# Patient Record
Sex: FEMALE | Race: WHITE | Employment: PART TIME | URBAN - METROPOLITAN AREA
[De-identification: names, ages, dates, MRNs, and addresses within clinical notes are randomized per-mention and may not be internally consistent; named-entity substitution may affect disease eponyms.]

---

## 2023-03-16 ENCOUNTER — HOSPITAL ENCOUNTER (EMERGENCY)
Age: 38
Discharge: HOME | End: 2023-03-16
Attending: EMERGENCY MEDICINE
Payer: MEDICAID

## 2023-03-16 ENCOUNTER — APPOINTMENT (OUTPATIENT)
Dept: GENERAL RADIOLOGY | Age: 38
End: 2023-03-16
Payer: MEDICAID

## 2023-03-16 VITALS
WEIGHT: 293 LBS | RESPIRATION RATE: 18 BRPM | DIASTOLIC BLOOD PRESSURE: 98 MMHG | TEMPERATURE: 97 F | HEART RATE: 91 BPM | SYSTOLIC BLOOD PRESSURE: 175 MMHG | OXYGEN SATURATION: 96 %

## 2023-03-16 DIAGNOSIS — U07.1 COVID: Primary | ICD-10-CM

## 2023-03-16 DIAGNOSIS — E86.0 DEHYDRATION: ICD-10-CM

## 2023-03-16 LAB
ALBUMIN SERPL-MCNC: 4.3 G/DL (ref 3.5–5.2)
ALBUMIN/GLOBULIN RATIO: 1.1 (ref 1–2.5)
ALP SERPL-CCNC: 85 U/L (ref 35–104)
ALT SERPL-CCNC: 91 U/L (ref 5–33)
ANION GAP SERPL CALCULATED.3IONS-SCNC: 19 MMOL/L (ref 9–17)
AST SERPL-CCNC: 147 U/L
BILIRUB SERPL-MCNC: 0.4 MG/DL (ref 0.3–1.2)
BUN SERPL-MCNC: 12 MG/DL (ref 6–20)
CALCIUM SERPL-MCNC: 8.7 MG/DL (ref 8.6–10.4)
CHLORIDE SERPL-SCNC: 97 MMOL/L (ref 98–107)
CO2 SERPL-SCNC: 20 MMOL/L (ref 20–31)
CREAT SERPL-MCNC: 1.11 MG/DL (ref 0.5–0.9)
CRP SERPL HS-MCNC: <3 MG/L (ref 0–5)
GFR SERPL CREATININE-BSD FRML MDRD: >60 ML/MIN/1.73M2
GLUCOSE SERPL-MCNC: 102 MG/DL (ref 70–99)
HCT VFR BLD AUTO: 43.4 % (ref 36.3–47.1)
HGB BLD-MCNC: 14.3 G/DL (ref 11.9–15.1)
LACTIC ACID, WHOLE BLOOD: 0.8 MMOL/L (ref 0.7–2.1)
LACTIC ACID, WHOLE BLOOD: 3.1 MMOL/L (ref 0.7–2.1)
LIPASE SERPL-CCNC: 42 U/L (ref 13–60)
MAGNESIUM SERPL-MCNC: 2 MG/DL (ref 1.6–2.6)
MCH RBC QN AUTO: 31.6 PG (ref 25.2–33.5)
MCHC RBC AUTO-ENTMCNC: 32.9 G/DL (ref 28.4–34.8)
MCV RBC AUTO: 95.8 FL (ref 82.6–102.9)
NRBC AUTOMATED: 0 PER 100 WBC
PDW BLD-RTO: 14.4 % (ref 11.8–14.4)
PLATELET # BLD AUTO: 178 K/UL (ref 138–453)
PMV BLD AUTO: 9.9 FL (ref 8.1–13.5)
POTASSIUM SERPL-SCNC: 4.4 MMOL/L (ref 3.7–5.3)
PROT SERPL-MCNC: 8.3 G/DL (ref 6.4–8.3)
RBC # BLD: 4.53 M/UL (ref 3.95–5.11)
SODIUM SERPL-SCNC: 136 MMOL/L (ref 135–144)
TROPONIN I SERPL DL<=0.01 NG/ML-MCNC: 11 NG/L (ref 0–14)
TROPONIN I SERPL DL<=0.01 NG/ML-MCNC: <6 NG/L (ref 0–14)
WBC # BLD AUTO: 4.2 K/UL (ref 3.5–11.3)

## 2023-03-16 PROCEDURE — 6360000002 HC RX W HCPCS: Performed by: STUDENT IN AN ORGANIZED HEALTH CARE EDUCATION/TRAINING PROGRAM

## 2023-03-16 PROCEDURE — 96361 HYDRATE IV INFUSION ADD-ON: CPT

## 2023-03-16 PROCEDURE — 83690 ASSAY OF LIPASE: CPT

## 2023-03-16 PROCEDURE — 96374 THER/PROPH/DIAG INJ IV PUSH: CPT

## 2023-03-16 PROCEDURE — 2580000003 HC RX 258: Performed by: STUDENT IN AN ORGANIZED HEALTH CARE EDUCATION/TRAINING PROGRAM

## 2023-03-16 PROCEDURE — 71046 X-RAY EXAM CHEST 2 VIEWS: CPT

## 2023-03-16 PROCEDURE — 85027 COMPLETE CBC AUTOMATED: CPT

## 2023-03-16 PROCEDURE — 99285 EMERGENCY DEPT VISIT HI MDM: CPT

## 2023-03-16 PROCEDURE — 86140 C-REACTIVE PROTEIN: CPT

## 2023-03-16 PROCEDURE — 84484 ASSAY OF TROPONIN QUANT: CPT

## 2023-03-16 PROCEDURE — 96376 TX/PRO/DX INJ SAME DRUG ADON: CPT

## 2023-03-16 PROCEDURE — 80053 COMPREHEN METABOLIC PANEL: CPT

## 2023-03-16 PROCEDURE — 83735 ASSAY OF MAGNESIUM: CPT

## 2023-03-16 PROCEDURE — 83605 ASSAY OF LACTIC ACID: CPT

## 2023-03-16 RX ORDER — ONDANSETRON 2 MG/ML
4 INJECTION INTRAMUSCULAR; INTRAVENOUS ONCE
Status: COMPLETED | OUTPATIENT
Start: 2023-03-16 | End: 2023-03-16

## 2023-03-16 RX ORDER — ONDANSETRON 4 MG/1
4 TABLET, ORALLY DISINTEGRATING ORAL 3 TIMES DAILY PRN
Qty: 21 TABLET | Refills: 0 | Status: SHIPPED | OUTPATIENT
Start: 2023-03-16

## 2023-03-16 RX ORDER — 0.9 % SODIUM CHLORIDE 0.9 %
1000 INTRAVENOUS SOLUTION INTRAVENOUS ONCE
Status: DISCONTINUED | OUTPATIENT
Start: 2023-03-16 | End: 2023-03-16 | Stop reason: HOSPADM

## 2023-03-16 RX ORDER — ACETAMINOPHEN 500 MG
1000 TABLET ORAL ONCE
Status: DISCONTINUED | OUTPATIENT
Start: 2023-03-16 | End: 2023-03-16 | Stop reason: HOSPADM

## 2023-03-16 RX ORDER — METOCLOPRAMIDE 10 MG/1
10 TABLET ORAL 4 TIMES DAILY
Qty: 20 TABLET | Refills: 0 | Status: SHIPPED | OUTPATIENT
Start: 2023-03-16

## 2023-03-16 RX ORDER — 0.9 % SODIUM CHLORIDE 0.9 %
1000 INTRAVENOUS SOLUTION INTRAVENOUS ONCE
Status: COMPLETED | OUTPATIENT
Start: 2023-03-16 | End: 2023-03-16

## 2023-03-16 RX ADMIN — ONDANSETRON 4 MG: 2 INJECTION INTRAMUSCULAR; INTRAVENOUS at 20:05

## 2023-03-16 RX ADMIN — ONDANSETRON 4 MG: 2 INJECTION INTRAMUSCULAR; INTRAVENOUS at 19:19

## 2023-03-16 RX ADMIN — SODIUM CHLORIDE 1000 ML: 9 INJECTION, SOLUTION INTRAVENOUS at 19:19

## 2023-03-16 ASSESSMENT — ENCOUNTER SYMPTOMS
SHORTNESS OF BREATH: 0
BACK PAIN: 1
DIARRHEA: 1
VOMITING: 1
COUGH: 1
ABDOMINAL PAIN: 1
NAUSEA: 1

## 2023-03-16 ASSESSMENT — PAIN - FUNCTIONAL ASSESSMENT: PAIN_FUNCTIONAL_ASSESSMENT: 0-10

## 2023-03-16 ASSESSMENT — PAIN SCALES - GENERAL: PAINLEVEL_OUTOF10: 7

## 2023-03-16 NOTE — Clinical Note
Chno Mcintyre was seen and treated in our emergency department on 3/16/2023. She may return to work on . If you have any questions or concerns, please don't hesitate to call.       Andres Delcid, DO

## 2023-03-16 NOTE — Clinical Note
Kole Shanks was seen and treated in our emergency department on 3/16/2023. She may return to work on . Asymptomatic without fevers for 24 - 48 hrs     If you have any questions or concerns, please don't hesitate to call.       Clinton Storey, DO

## 2023-03-16 NOTE — ED PROVIDER NOTES
[facility]  ED  Emergency Department Encounter  Emergency Medicine Resident     Pt Aníbal Tolentino  MRN: [id number]  Janaegfkimberley 1985  Date of evaluation: 3/16/23  PCP:  No primary care provider on file. CHIEF COMPLAINT       Chief Complaint   Patient presents with    Positive For Covid-19    Nausea       HISTORY OF PRESENT ILLNESS  (Location/Symptom, Timing/Onset, Context/Setting, Quality, Duration, Modifying Factors, Severity.)      Timoteo Huynh is a 30 y.o. female who presents with complaints of worsening abdominal pain, persistent fevers, persistent chills. Diagnosed with COVID 3 days ago and is on day 5 total of symptoms. She states that she tried to go back to work today and was unable to. She called her friend as he was driving by, he found her laying on the ground on the cement dry heaving. She has been taking DayQuil and NyQuil at home for symptom relief. She does have an extensive surgical history including multiple surgeries by both general surgery, bariatric surgery and OB/GYN. PAST MEDICAL / SURGICAL / SOCIAL / FAMILY HISTORY      has no past medical history on file. has no past surgical history on file.       Social History     Socioeconomic History    Marital status:      Spouse name: Not on file    Number of children: Not on file    Years of education: Not on file    Highest education level: Not on file   Occupational History    Not on file   Tobacco Use    Smoking status: Not on file    Smokeless tobacco: Not on file   Substance and Sexual Activity    Alcohol use: Not on file    Drug use: Not on file    Sexual activity: Not on file   Other Topics Concern    Not on file   Social History Narrative    Not on file     Social Determinants of Health     Financial Resource Strain: Not on file   Food Insecurity: Not on file   Transportation Needs: Not on file   Physical Activity: Not on file   Stress: Not on file   Social Connections: Not on file Intimate Partner Violence: Not on file   Housing Stability: Not on file       History reviewed. No pertinent family history. Allergies:  Ibuprofen    Home Medications:  Prior to Admission medications    Not on File           REVIEW OF SYSTEMS       Review of Systems   Constitutional:  Positive for activity change, appetite change, chills and fever. Respiratory:  Positive for cough. Negative for shortness of breath. Cardiovascular:  Negative for chest pain. Gastrointestinal:  Positive for abdominal pain, diarrhea, nausea and vomiting. Genitourinary:  Negative for difficulty urinating, flank pain and frequency. Musculoskeletal:  Positive for arthralgias, back pain and myalgias. Psychiatric/Behavioral:  The patient is nervous/anxious. PHYSICAL EXAM      INITIAL VITALS:   BP (!) 175/98   Pulse 91   Temp 97 °F (36.1 °C) (Oral)   Resp 18   Wt (!) 310 lb (140.6 kg)   SpO2 96%     Physical Exam  Constitutional:       General: She is in acute distress. Appearance: She is obese. She is not ill-appearing. HENT:      Head: Normocephalic and atraumatic. Nose: Nose normal.   Eyes:      Extraocular Movements: Extraocular movements intact. Pupils: Pupils are equal, round, and reactive to light. Cardiovascular:      Rate and Rhythm: Normal rate and regular rhythm. Pulses: Normal pulses. Pulmonary:      Effort: Pulmonary effort is normal. No respiratory distress. Breath sounds: Normal breath sounds. No wheezing or rales. Abdominal:      General: Abdomen is flat. Palpations: Abdomen is soft. Tenderness: There is abdominal tenderness. There is no guarding or rebound. Musculoskeletal:         General: Normal range of motion. Right lower leg: No edema. Left lower leg: No edema. Skin:     General: Skin is warm and dry. Capillary Refill: Capillary refill takes less than 2 seconds. Neurological:      General: No focal deficit present.       Mental Status: She is oriented to person, place, and time. Psychiatric:      Comments: Anxious         DDX/DIAGNOSTIC RESULTS / EMERGENCY DEPARTMENT COURSE / MDM     Medical Decision Making  Theron Virgen is a 30 y.o. female who presents with complaints of worsening abdominal pain, persistent fevers, persistent chills. Diagnosed with COVID 3 days ago and is on day 5 total of symptoms. She states that she tried to go back to work today and was unable to. She called her friend as he was driving by, he found her laying on the ground on the cement dry heaving. She has been taking DayQuil and NyQuil at home for symptom relief. She does have an extensive surgical history including multiple surgeries by both general surgery, bariatric surgery and OB/GYN. On exam the patient is writhing around, tremulous, appears to have rigors and chills. Belly soft, minimal tenderness to palpation, nonsurgical abdomen, breath sounds clear, vital stable. We will repeat two-view chest x-ray to rule out pneumonia, repeat labs given history of profound diarrhea and decreased p.o. intake, begin IV fluid resuscitation, began IV nausea medication and Tylenol for fever/chills. Lactic acid elevated at 3.1, creatinine mildly elevated, aggressive fluid resuscitation and repeat nausea control. We will repeat a lactic acid and reevaluate the patient. Patient can likely be discharged home. Patient signed out prior to discharge to Dr. Lanre Patel and/or Complexity of Data Reviewed  Labs: ordered. Radiology: ordered. ECG/medicine tests: ordered. Risk  OTC drugs. Prescription drug management. Procedures    CONSULTS:  None        FINAL IMPRESSION      1. COVID          DISPOSITION / PLAN     DISPOSITION    Please see Dr. Steiner Shallow note     Queen Carolinmenez:  No follow-up provider specified.     DISCHARGE MEDICATIONS:  New Prescriptions    No medications on file       Shirin Silverman MD  Emergency Medicine Resident    (Please note that portions of thisnote were completed with a voice recognition program.  Efforts were made to edit the dictations but occasionally words are mis-transcribed.)       Crystal Sandoval MD  Resident  03/16/23 2022

## 2023-03-16 NOTE — ED PROVIDER NOTES
[facility] ED     Emergency Department     Faculty Attestation        I performed a history and physical examination of the patient and discussed management with the resident. I reviewed the residents note and agree with the documented findings and plan of care. Any areas of disagreement are noted on the chart. I was personally present for the key portions of any procedures. I have documented in the chart those procedures where I was not present during the key portions. I have reviewed the emergency nurses triage note. I agree with the chief complaint, past medical history, past surgical history, allergies, medications, social and family history as documented unless otherwise noted below. For mid-level providers such as nurse practitioners as well as physicians assistants:    I have personally seen and evaluated the patient. I find the patient's history and physical exam are consistent with NP/PA documentation. I agree with the care provided, treatment rendered, disposition, & follow-up plan. Additional findings are as noted. Vital Signs: BP (!) 175/98   Pulse 91   Temp 97 °F (36.1 °C) (Oral)   Resp 18   Wt (!) 310 lb (140.6 kg)   SpO2 96%   PCP:  No primary care provider on file. Pertinent Comments:     Patient recently diagnosed with COVID presents with nausea and vomiting.   She is a gastric bypass patient was operated on about a decade ago by surgeon up in [place] which she no longer follows for she is afebrile nontoxic here abdomen soft nontender      Critical Care  None          Juan Rodriguez MD    Attending Emergency Medicine Physician            Natalie Mcallister MD  03/16/23 1833

## 2023-03-17 NOTE — DISCHARGE INSTRUCTIONS
You are seen in the emergency department for abdominal pain, nausea/vomiting, fever in the setting of COVID-19. Diagnosed with dehydration in the setting of COVID-19. Your lactic acid down trended from 3.1-0.8 after IV fluids. Please continue to hydrate at home. Please follow-up with your primary care physician within the next 1 to 2 days. Please return to the emergency department if you begin to experience worsening symptoms or any concerning symptoms.

## 2023-03-17 NOTE — ED PROVIDER NOTES
[facility]  ED  Emergency Department  Emergency Medicine Resident Sign-out     Care of Ellis Reid was assumed from Dr. Lucy Hudson and is being seen for Positive For Covid-19 and Nausea  . The patient's initial evaluation and plan have been discussed with the prior provider who initially evaluated the patient. EMERGENCY DEPARTMENT COURSE / MEDICAL DECISION MAKING:       MEDICATIONS GIVEN:  Orders Placed This Encounter   Medications    0.9 % sodium chloride bolus    ondansetron (ZOFRAN) injection 4 mg    acetaminophen (TYLENOL) tablet 1,000 mg    0.9 % sodium chloride bolus    ondansetron (ZOFRAN) injection 4 mg    ondansetron (ZOFRAN-ODT) 4 MG disintegrating tablet     Sig: Take 1 tablet by mouth 3 times daily as needed for Nausea or Vomiting     Dispense:  21 tablet     Refill:  0    metoclopramide (REGLAN) 10 MG tablet     Sig: Take 1 tablet by mouth 4 times daily     Dispense:  20 tablet     Refill:  0       LABS / RADIOLOGY:     Labs Reviewed   COMPREHENSIVE METABOLIC PANEL - Abnormal; Notable for the following components:       Result Value    Glucose 102 (*)     Creatinine 1.11 (*)     Chloride 97 (*)     Anion Gap 19 (*)     ALT 91 (*)      (*)     All other components within normal limits   LACTIC ACID - Abnormal; Notable for the following components:    Lactic Acid, Whole Blood 3.1 (*)     All other components within normal limits   CBC   TROPONIN   TROPONIN   LIPASE   MAGNESIUM   C-REACTIVE PROTEIN   LACTIC ACID       XR CHEST (2 VW)    Result Date: 3/16/2023  EXAMINATION: TWO XRAY VIEWS OF THE CHEST 3/16/2023 6:59 pm COMPARISON: None. HISTORY: ORDERING SYSTEM PROVIDED HISTORY: concern for pneumonia TECHNOLOGIST PROVIDED HISTORY: concern for pneumonia Reason for Exam: Positive For Covid-19; Nausea FINDINGS: The cardiomediastinal silhouette is normal in size. The lungs are clear. No pleural effusion or pneumothorax. No acute cardiopulmonary process.        RECENT VITALS:     Temp: 97 °F (36.1 °C),  Heart Rate: 91, Resp: 18, BP: (!) 175/98, SpO2: 96 %      This patient is a 30 y.o. Female without significant past medical history who presented to the ED with worsening abdominal pain, nausea/vomiting, persistent fevers/chills in the setting of COVID-19. She states that she was diagnosed with COVID 3 days ago and has been experiencing symptoms for the past 5 days. She attempted to return to work earlier today however was unable to do so due to the symptoms. She has been trying to alleviate her symptoms with NyQuil and DayQuil with minimal relief. Of note, she does have an extensive surgical history with multiple surgeries including bariatric surgery, OB/GYN surgery, and general surgery. In the ED she was given Zofran, IV fluids, and Tylenol. Appears dehydration and volume down on physical exam.  Creatinine 1.11, chloride 97. Sodium, potassium, magnesium unremarkable. Initial lactic acid elevated at 3.1. OUTSTANDING TASKS / RECOMMENDATIONS:    Follow-up lactic acid. If downtrending, then discharge home. If discharged, discharge with Zofran ODT and follow-up with PCP     FINAL IMPRESSION:     1. COVID    2. Dehydration      Lactic acid downtrending from 3.1 --> 0.8 aft IV fluids. Patient medically stable to be discharged home with close follow-up with PCP. Patient requesting Reglan for nausea as well (previously prescribed by her bariatric surgeon).  Work note provided    DISPOSITION:         DISPOSITION:  [x]  Discharge   []  Transfer -    []  Admission -     []  Against Medical Advice   []  Eloped   FOLLOW-UP: [address] [id number]  Call in 1 day  for ED follow up   DISCHARGE MEDICATIONS: New Prescriptions    METOCLOPRAMIDE (REGLAN) 10 MG TABLET    Take 1 tablet by mouth 4 times daily    ONDANSETRON (ZOFRAN-ODT) 4 MG DISINTEGRATING TABLET    Take 1 tablet by mouth 3 times daily as needed for Nausea or Vomiting          Dominik Cooper DO  Emergency Medicine Resident  [facility]       Dominik Cooper [place]  Resident  03/16/23 [id number]       Dominik Cooper DO  Resident  03/16/23 2536

## 2023-03-21 LAB
EKG ATRIAL RATE: 78 BPM
EKG P AXIS: 59 DEGREES
EKG P-R INTERVAL: 142 MS
EKG Q-T INTERVAL: 394 MS
EKG QRS DURATION: 80 MS
EKG QTC CALCULATION (BAZETT): 449 MS
EKG R AXIS: 25 DEGREES
EKG T AXIS: 66 DEGREES
EKG VENTRICULAR RATE: 78 BPM